# Patient Record
Sex: FEMALE | Race: WHITE
[De-identification: names, ages, dates, MRNs, and addresses within clinical notes are randomized per-mention and may not be internally consistent; named-entity substitution may affect disease eponyms.]

---

## 2019-01-19 ENCOUNTER — HOSPITAL ENCOUNTER (INPATIENT)
Dept: HOSPITAL 95 - ER | Age: 84
LOS: 3 days | Discharge: HOME | DRG: 392 | End: 2019-01-22
Attending: FAMILY MEDICINE | Admitting: FAMILY MEDICINE
Payer: MEDICARE

## 2019-01-19 VITALS — WEIGHT: 282.85 LBS | BODY MASS INDEX: 48.29 KG/M2 | HEIGHT: 64.02 IN

## 2019-01-19 DIAGNOSIS — G70.9: ICD-10-CM

## 2019-01-19 DIAGNOSIS — Z88.1: ICD-10-CM

## 2019-01-19 DIAGNOSIS — E78.1: ICD-10-CM

## 2019-01-19 DIAGNOSIS — I12.9: ICD-10-CM

## 2019-01-19 DIAGNOSIS — Z88.2: ICD-10-CM

## 2019-01-19 DIAGNOSIS — Z79.84: ICD-10-CM

## 2019-01-19 DIAGNOSIS — E11.65: ICD-10-CM

## 2019-01-19 DIAGNOSIS — Z98.84: ICD-10-CM

## 2019-01-19 DIAGNOSIS — R60.0: ICD-10-CM

## 2019-01-19 DIAGNOSIS — N28.9: ICD-10-CM

## 2019-01-19 DIAGNOSIS — R50.9: ICD-10-CM

## 2019-01-19 DIAGNOSIS — Z98.0: ICD-10-CM

## 2019-01-19 DIAGNOSIS — Z87.891: ICD-10-CM

## 2019-01-19 DIAGNOSIS — E11.22: ICD-10-CM

## 2019-01-19 DIAGNOSIS — E86.0: ICD-10-CM

## 2019-01-19 DIAGNOSIS — E03.9: ICD-10-CM

## 2019-01-19 DIAGNOSIS — E83.42: ICD-10-CM

## 2019-01-19 DIAGNOSIS — Z88.5: ICD-10-CM

## 2019-01-19 DIAGNOSIS — E66.9: ICD-10-CM

## 2019-01-19 DIAGNOSIS — K52.9: Primary | ICD-10-CM

## 2019-01-19 DIAGNOSIS — E11.42: ICD-10-CM

## 2019-01-19 DIAGNOSIS — Z90.49: ICD-10-CM

## 2019-01-19 DIAGNOSIS — N18.3: ICD-10-CM

## 2019-01-19 DIAGNOSIS — E78.5: ICD-10-CM

## 2019-01-19 LAB
ALBUMIN SERPL BCP-MCNC: 3.2 G/DL (ref 3.4–5)
ALBUMIN/GLOB SERPL: 0.9 {RATIO} (ref 0.8–1.8)
ALT SERPL W P-5'-P-CCNC: 17 U/L (ref 12–78)
ANION GAP SERPL CALCULATED.4IONS-SCNC: 9 MMOL/L (ref 6–16)
AST SERPL W P-5'-P-CCNC: 14 U/L (ref 12–37)
BASOPHILS # BLD AUTO: 0.02 K/MM3 (ref 0–0.23)
BASOPHILS NFR BLD AUTO: 0 % (ref 0–2)
BILIRUB SERPL-MCNC: 1.1 MG/DL (ref 0.1–1)
BUN SERPL-MCNC: 33 MG/DL (ref 8–24)
CALCIUM SERPL-MCNC: 8.4 MG/DL (ref 8.5–10.1)
CHLORIDE SERPL-SCNC: 105 MMOL/L (ref 98–108)
CO2 SERPL-SCNC: 28 MMOL/L (ref 21–32)
CREAT SERPL-MCNC: 1.53 MG/DL (ref 0.4–1)
DEPRECATED RDW RBC AUTO: 44.9 FL (ref 35.1–46.3)
EOSINOPHIL # BLD AUTO: 0.11 K/MM3 (ref 0–0.68)
EOSINOPHIL NFR BLD AUTO: 1 % (ref 0–6)
ERYTHROCYTE [DISTWIDTH] IN BLOOD BY AUTOMATED COUNT: 13.2 % (ref 11.7–14.2)
GLOBULIN SER CALC-MCNC: 3.4 G/DL (ref 2.2–4)
GLUCOSE SERPL-MCNC: 116 MG/DL (ref 70–99)
HCT VFR BLD AUTO: 43.2 % (ref 33–51)
HGB BLD-MCNC: 14 G/DL (ref 11.5–16)
IMM GRANULOCYTES # BLD AUTO: 0.03 K/MM3 (ref 0–0.1)
IMM GRANULOCYTES NFR BLD AUTO: 0 % (ref 0–1)
LYMPHOCYTES # BLD AUTO: 2 K/MM3 (ref 0.84–5.2)
LYMPHOCYTES NFR BLD AUTO: 19 % (ref 21–46)
MCHC RBC AUTO-ENTMCNC: 32.4 G/DL (ref 31.5–36.5)
MCV RBC AUTO: 93 FL (ref 80–100)
MONOCYTES # BLD AUTO: 1.66 K/MM3 (ref 0.16–1.47)
MONOCYTES NFR BLD AUTO: 15 % (ref 4–13)
NEUTROPHILS # BLD AUTO: 7 K/MM3 (ref 1.96–9.15)
NEUTROPHILS NFR BLD AUTO: 65 % (ref 41–73)
NRBC # BLD AUTO: 0 K/MM3 (ref 0–0.02)
NRBC BLD AUTO-RTO: 0 /100 WBC (ref 0–0.2)
PLATELET # BLD AUTO: 250 K/MM3 (ref 150–400)
POTASSIUM SERPL-SCNC: 3.7 MMOL/L (ref 3.5–5.5)
PROT SERPL-MCNC: 6.6 G/DL (ref 6.4–8.2)
SODIUM SERPL-SCNC: 142 MMOL/L (ref 136–145)

## 2019-01-20 LAB
ALBUMIN SERPL BCP-MCNC: 2.5 G/DL (ref 3.4–5)
ALBUMIN/GLOB SERPL: 0.9 {RATIO} (ref 0.8–1.8)
ALT SERPL W P-5'-P-CCNC: 11 U/L (ref 12–78)
ANION GAP SERPL CALCULATED.4IONS-SCNC: 8 MMOL/L (ref 6–16)
AST SERPL W P-5'-P-CCNC: 10 U/L (ref 12–37)
BASOPHILS # BLD AUTO: 0.03 K/MM3 (ref 0–0.23)
BASOPHILS NFR BLD AUTO: 0 % (ref 0–2)
BILIRUB SERPL-MCNC: 1.1 MG/DL (ref 0.1–1)
BUN SERPL-MCNC: 25 MG/DL (ref 8–24)
CALCIUM SERPL-MCNC: 6.9 MG/DL (ref 8.5–10.1)
CHLORIDE SERPL-SCNC: 112 MMOL/L (ref 98–108)
CO2 SERPL-SCNC: 25 MMOL/L (ref 21–32)
CREAT SERPL-MCNC: 1.23 MG/DL (ref 0.4–1)
CRP SERPL-MCNC: 1.92 MG/DL (ref 0–0.3)
DEPRECATED RDW RBC AUTO: 45 FL (ref 35.1–46.3)
EOSINOPHIL # BLD AUTO: 0.23 K/MM3 (ref 0–0.68)
EOSINOPHIL NFR BLD AUTO: 2 % (ref 0–6)
ERYTHROCYTE [DISTWIDTH] IN BLOOD BY AUTOMATED COUNT: 13.3 % (ref 11.7–14.2)
GLOBULIN SER CALC-MCNC: 2.7 G/DL (ref 2.2–4)
GLUCOSE SERPL-MCNC: 102 MG/DL (ref 70–99)
HCT VFR BLD AUTO: 35.5 % (ref 33–51)
HGB BLD-MCNC: 11.5 G/DL (ref 11.5–16)
IMM GRANULOCYTES # BLD AUTO: 0.03 K/MM3 (ref 0–0.1)
IMM GRANULOCYTES NFR BLD AUTO: 0 % (ref 0–1)
KETONES UR STRIP-MCNC: (no result) MG/DL
LEUKOCYTE ESTERASE UR QL STRIP: (no result)
LYMPHOCYTES # BLD AUTO: 2.31 K/MM3 (ref 0.84–5.2)
LYMPHOCYTES NFR BLD AUTO: 24 % (ref 21–46)
MAGNESIUM SERPL-MCNC: 1.1 MG/DL (ref 1.6–2.4)
MCHC RBC AUTO-ENTMCNC: 32.4 G/DL (ref 31.5–36.5)
MCV RBC AUTO: 92 FL (ref 80–100)
MONOCYTES # BLD AUTO: 1.78 K/MM3 (ref 0.16–1.47)
MONOCYTES NFR BLD AUTO: 18 % (ref 4–13)
NEUTROPHILS # BLD AUTO: 5.27 K/MM3 (ref 1.96–9.15)
NEUTROPHILS NFR BLD AUTO: 55 % (ref 41–73)
NRBC # BLD AUTO: 0 K/MM3 (ref 0–0.02)
NRBC BLD AUTO-RTO: 0 /100 WBC (ref 0–0.2)
PLATELET # BLD AUTO: 200 K/MM3 (ref 150–400)
POTASSIUM SERPL-SCNC: 3.2 MMOL/L (ref 3.5–5.5)
PROT SERPL-MCNC: 5.2 G/DL (ref 6.4–8.2)
PROT UR STRIP-MCNC: (no result) MG/DL
RBC #/AREA URNS HPF: (no result) /HPF (ref 0–2)
SODIUM SERPL-SCNC: 145 MMOL/L (ref 136–145)
SP GR SPEC: 1.02 (ref 1–1.02)
TSH SERPL DL<=0.005 MIU/L-ACNC: 2.05 UIU/ML (ref 0.36–4.8)
UROBILINOGEN UR STRIP-MCNC: (no result) MG/DL
WBC #/AREA URNS HPF: (no result) /HPF (ref 0–5)

## 2019-01-20 NOTE — NUR
SHIFT SUMMARY
PT ALERT AND ORIENTED. OFFERS NO C/O'S. PT ATE SOME JELLO AND SHORTLY AFTER TO
BATHROOM MULTIPLE TIMES WITH LOOSE STOOLS. PT UPSET BECAUSE SHE IS NOT ABLE TO
ALWAYS MAKE IT TO THE BATHROOM. PT BECOMING WEAKER THE MORE SHE GETS UP AND
NEEDS HELP GETTING LOWER EXTREMITIES BACK INTO BED. PT HAS BEEN SLEEPING NOW
AND NOT UP TO BATHROOM WITH LOOSE STOOLS SINCE ABOUT MIDNIGHT. IVF'S INFUSING
PER PUMP WITHOUT DIFFICULTY, ARMBOARD PLACED ON RIGHT ARM PER PT REQUEST TO
PREVENT HER FROM BENDING AT ELBOW. WILL CONTINUE TO MONITOR.

## 2019-01-20 NOTE — NUR
PATIENT A/O X4, UP WITH SBA TO RESTROOM. TOLERATING ADA DIET THIS SHIFT, NO
DIARRHEA SINCE THIS AM. HAS NOT REQUIRED ANYTHING FOR PAIN. VSS. 18G IV TO L
UPPER ARM WITH NS @ 125/HR. MAGNESIUM AND POTASSIUM REPLACED TODAY. ACHS BLOOD
SUGARS, NO COVERAGE NEEDED THIS SHIFT. CALM AND COOPERATIVE WITH CARE, CALLS
APPROPRIATELY FOR ASSISTANCE. NO ACUTE CHANGES THIS SHIFT.

## 2019-01-21 LAB
ALBUMIN SERPL BCP-MCNC: 2.6 G/DL (ref 3.4–5)
ANION GAP SERPL CALCULATED.4IONS-SCNC: 7 MMOL/L (ref 6–16)
BUN SERPL-MCNC: 15 MG/DL (ref 8–24)
CALCIUM SERPL-MCNC: 7.3 MG/DL (ref 8.5–10.1)
CHLORIDE SERPL-SCNC: 114 MMOL/L (ref 98–108)
CO2 SERPL-SCNC: 22 MMOL/L (ref 21–32)
CREAT SERPL-MCNC: 1.1 MG/DL (ref 0.4–1)
DEPRECATED RDW RBC AUTO: 45.8 FL (ref 35.1–46.3)
ERYTHROCYTE [DISTWIDTH] IN BLOOD BY AUTOMATED COUNT: 13.2 % (ref 11.7–14.2)
GLUCOSE SERPL-MCNC: 135 MG/DL (ref 70–99)
HCT VFR BLD AUTO: 37.1 % (ref 33–51)
HGB BLD-MCNC: 11.8 G/DL (ref 11.5–16)
MAGNESIUM SERPL-MCNC: 1.8 MG/DL (ref 1.6–2.4)
MCHC RBC AUTO-ENTMCNC: 31.8 G/DL (ref 31.5–36.5)
MCV RBC AUTO: 94 FL (ref 80–100)
NRBC # BLD AUTO: 0 K/MM3 (ref 0–0.02)
NRBC BLD AUTO-RTO: 0 /100 WBC (ref 0–0.2)
PHOSPHATE SERPL-MCNC: 2.1 MG/DL (ref 2.5–4.9)
PLATELET # BLD AUTO: 194 K/MM3 (ref 150–400)
POTASSIUM SERPL-SCNC: 3.7 MMOL/L (ref 3.5–5.5)
SODIUM SERPL-SCNC: 143 MMOL/L (ref 136–145)

## 2019-01-21 NOTE — NUR
SHIFT SUMMARY
PT HAD BOUT OF LOOSE STOOLS X 3-4 EARLY ON IN EVENING. PT BECOMES TEARY
BECAUSE SHE FEELS SO WEAK AND NEEDS HELP TO GET LOWER EXTREMITIES BACK INTO
BED. PT HAS SLEPT WELL SINCE ABOUT 10 PM, NO FURTHER STOOLS. WILL CONTINUE TO
MONITOR.

## 2019-01-21 NOTE — NUR
ALERT AND ORIENTED. STANDBY ASSIST TO BATHROOM. IV PATENT W/POTASSIUM INFUSING
W/NACL AT SLOW RATE. DENIES PAIN. UNLABORED RESPIRATIONS. PLEASANT,
COOPERATIVE. POSSIBLE D'C TOMORROW. WILL CONTINUE TO MONITOR.

## 2019-01-21 NOTE — NUR
Patient gave consent for student nurse, Marga Marmolejo to assist RN with her care
on 1/21/19.  Care will be given 1/22/19.

## 2019-01-22 LAB
ANION GAP SERPL CALCULATED.4IONS-SCNC: 8 MMOL/L (ref 6–16)
BASOPHILS # BLD AUTO: 0.02 K/MM3 (ref 0–0.23)
BASOPHILS NFR BLD AUTO: 0 % (ref 0–2)
BUN SERPL-MCNC: 11 MG/DL (ref 8–24)
CALCIUM SERPL-MCNC: 7.5 MG/DL (ref 8.5–10.1)
CHLORIDE SERPL-SCNC: 114 MMOL/L (ref 98–108)
CO2 SERPL-SCNC: 21 MMOL/L (ref 21–32)
CREAT SERPL-MCNC: 1.09 MG/DL (ref 0.4–1)
DEPRECATED RDW RBC AUTO: 46.5 FL (ref 35.1–46.3)
EOSINOPHIL # BLD AUTO: 0.27 K/MM3 (ref 0–0.68)
EOSINOPHIL NFR BLD AUTO: 3 % (ref 0–6)
ERYTHROCYTE [DISTWIDTH] IN BLOOD BY AUTOMATED COUNT: 13.4 % (ref 11.7–14.2)
GLUCOSE SERPL-MCNC: 146 MG/DL (ref 70–99)
HCT VFR BLD AUTO: 36.8 % (ref 33–51)
HGB BLD-MCNC: 11.7 G/DL (ref 11.5–16)
IMM GRANULOCYTES # BLD AUTO: 0.09 K/MM3 (ref 0–0.1)
IMM GRANULOCYTES NFR BLD AUTO: 1 % (ref 0–1)
LYMPHOCYTES # BLD AUTO: 2.04 K/MM3 (ref 0.84–5.2)
LYMPHOCYTES NFR BLD AUTO: 21 % (ref 21–46)
MCHC RBC AUTO-ENTMCNC: 31.8 G/DL (ref 31.5–36.5)
MCV RBC AUTO: 94 FL (ref 80–100)
MONOCYTES # BLD AUTO: 1.5 K/MM3 (ref 0.16–1.47)
MONOCYTES NFR BLD AUTO: 16 % (ref 4–13)
NEUTROPHILS # BLD AUTO: 5.71 K/MM3 (ref 1.96–9.15)
NEUTROPHILS NFR BLD AUTO: 59 % (ref 41–73)
NRBC # BLD AUTO: 0 K/MM3 (ref 0–0.02)
NRBC BLD AUTO-RTO: 0 /100 WBC (ref 0–0.2)
PLATELET # BLD AUTO: 187 K/MM3 (ref 150–400)
POTASSIUM SERPL-SCNC: 3.7 MMOL/L (ref 3.5–5.5)
SODIUM SERPL-SCNC: 143 MMOL/L (ref 136–145)

## 2019-01-22 NOTE — NUR
REVIEW D'C INSTRUCTIONS EXTENSIVELY.AWARE TO  MEDS AT [facility]. REVIEW
ALL NEW MEDS WITH PATIENT BEING FAMILAR WITH HOW TO GIVE INSULIN INJECTIONS.
ALREADY HAS APPT W/PCP. AWARE IF HAVING PROBLEMS TO CALL PCP OR GO TO E.R. IN
W/C W/ESCORT AND MULTIPLE FAMILY MEMBERS.

## 2019-01-22 NOTE — NUR
SHIFT SUMMARY
PT AWAKE, SITTING IN CHAIR AT BS, DURING SHIFT REPORT. VISITORS IN RM. PT
REPORTED THAT SHE HAS HAD DIARRHEA FOR SEVERAL MONTHS, AND UNABLE TO GET IT
STOPPED OR CHECKED OUT. PT REPORTED THAT SHE IS THE CAREGIVER FOR HER 
AND UNABLE TO LEAVE HIM, UNTIL DAUGHTER CAME UP FOR A VISIT. PT REPORTED
IMPROVEMENT SINCE ADMISSION. CALLS FOR ASSIST TO BTHRM WITH IV PUMP. PT
MORBIDLY, MAKING MOBILITY DIFFICULT AND DOES NOT LIKE TOO MUCH HELP D/T
ARTHRITIS PAIN. C/O SWELLING IN HER LEGS, BUT SITS IN A CHAIR WITH LE'S
DEPENDANT MUCH OF THE TIME. ENCOURGED PT TO ELEVATE WHEN POSSIBLE. PT REPORTED
DIFFICULTY WITH LYING IN BED D/T BACK AND SHOULDER PAIN. HAS SEEMED TO REST
WELL THOUGH TONIGHT, WHILE IN BED. HAS DENIED NEEDS, EXCEPT FOR ASSIST TO
BTHRM. CALL LT IN REACH. ABLE TO MAKE NEEDS KNOWN.

## 2019-04-18 ENCOUNTER — HOSPITAL ENCOUNTER (EMERGENCY)
Dept: HOSPITAL 95 - ER | Age: 84
Discharge: HOME | End: 2019-04-18
Payer: MEDICARE

## 2019-04-18 VITALS — WEIGHT: 247.01 LBS | HEIGHT: 64 IN | BODY MASS INDEX: 42.17 KG/M2

## 2019-04-18 DIAGNOSIS — N39.0: ICD-10-CM

## 2019-04-18 DIAGNOSIS — M54.5: Primary | ICD-10-CM

## 2019-04-18 LAB
LEUKOCYTE ESTERASE UR QL STRIP: (no result)
SP GR SPEC: 1 (ref 1–1.02)
UROBILINOGEN UR STRIP-MCNC: (no result) MG/DL
WBC #/AREA URNS HPF: (no result) /HPF (ref 0–5)

## 2023-01-22 NOTE — NUR
Summary: Patient admitted overnight from ER for CHF exas. AOx4. VSS. Med rec
completed with her daughter. Omalley present on admission so we removed that and
changed it out, sent UA per protocol. Patient has red rash and excoriation on
folds in elbows, knees, groin and neck. Received order for nystatin powder and
applied it. Patient is a max assist for mobility and was able to stand and
pivot off of the ER stretcher when she arrived. Patient also has an abscess on
the back of her neck that was lanced a few days ago. On isolation for MRSA. On
3L NC.

## 2023-01-22 NOTE — NUR
ALERT AND ORIENTED, EMOTIONAL CRYING, REPORTS "EVERYTHING HURTS", DAUGHTER
HELPFUL IN CARE AT BEDSIDE, OINTMENT AND POWDER TO YEAST FOLDS, BOIL TO LEFT
POSTERIOR NECK DRSG CHANGED, WOUND CARE CONSULT ENTERED. 3L NC HYDRATION SATS
AT 95%, EASILY SOB, DIMINSHED FINE CRACKLE IN BASES OF LUNGS, ENCOURAGE DEEP
BREATHING AND COUGH. CALL LIGHT WITH IN REACH, MAKES NEEDS KNOWN, WILL RELAY
TO PM RN

## 2023-01-23 NOTE — NUR
PATIENT IS ALERT AND ORIENTED AND COOPERATIVE WITH CARE. [facility]. PATIENT'S
DAUGHTER HAS BEEN AT THE BEDSIDE THROUGHOUT THE SHIFT. PATIENT C/O GENERALIZED
PAIN AND PAIN/TINGLING IN HER BLE. DR. SANTANA ORDERED CYMBALTA FOR HER PAIN
AND ANXIETY. PATIENT STATED SHE WAS NAUSEATED LAST NIGHT AND IT RESOLVED THIS
MORNING UNTIL THIS AFTERNOON AFTER HER FIRST DOSE OF CYMBALTA THEN SHE C/O
NAUSEA ONCE AGAIN. THE PATIENT HAS BEEN ABLE TO SLEEP WELL THIS AFTERNOON ONCE
MEDICATED FOR NAUSEA. PATIENT IS C/O CONSTIPATION. DR. SANTANA HAS BEEN
NOTIFIED BY TELEPHONE OF THIS. WILL CONTINUE TO MONITOR

## 2023-01-23 NOTE — NUR
Summary: Patient uncomfortable in bed overnight. Found patient a different bed
and moved her over to it with staff members for comfort. Patient frequently
repositioned on 6 pillows throughout night. Stated she just wasn't comfortable
in the bed that her legs were tight from swelling and cannot tolerate laying
on her right side nor can she tolerate laying back in the bed. Patient 2x max
assist to turn in bed. Omalley in place. IV ABX given. On 3L NC. Patient became
nauseous overnight gave prn Zofran. Patient dry heaved but no emesis. Aox4.
Cream and power applied in folds for rash. Call light in reach.

## 2023-01-24 NOTE — NUR
PT A&OX4, PLEASANT, COOPERATIVE. CALL LIGHT IN REACH, CALLS APPROPRIATELY.
NAUSEA WITH INTERMITTENT DRY HEAVES DURING SHIFT, ZOFRAN IS EFFECTIVE. MOD
ASSIST TO REPOSITION IN BED. REDNESS IN SKIN FOLDS STILL PRESENT. NEW IV
PLACED, 22G L FA. NO EVENTS OVER NIGHT. STABLE ON 3L O2 NC.

## 2023-01-24 NOTE — NUR
PT IS A/OX4, COOPERATIVE. PT REPORTS NOT FEELING MUCH IMPROVED TODAY. PT
REPORTED CONSTIPATION AND AFTER SUPPOSITIRY AND ENEMAS HAD SOME HARD FORMED
STOOL OUTPUT.THE PT IS ON 1.5L/MIN O2 AT THIS TIME. PT WAS MEDICATED FOR PAIN
THIS AFTERNOON X1 WITH ULTRAM. PTS DAUGHTER IS AT THE BEDSIDE. PLAN FOR
DISCHARGE TO HOME WITH HOSPICE IN AM. CALL LIGHT IN REACH. WILL CONTINUE TO
MONITOR AND ASSESS FOR CHANGES

## 2023-01-24 NOTE — NUR
PT IMPACTED WITH STOOL, GAVE FLEETS ENEMA WITH NO RESULTS TRIED SMALL AMOUNT
OF DIGITAL DISIMPACTMENT THEN GAVE SOAP DINA ENEMA THE PT HAD A LARGE AMOUNT OF
HARD FORMED STOOL OUT

## 2023-01-25 NOTE — NUR
PT DISCHARGED FROM THE UNIT REPORT GIVEN TO HOSPICE NURSE. PT LEFT VIA GURNEY
WITH TRANSPORT. IV REMOVED. MENENDEZ LEFT IN PLACE. PT WILL D/C WITH O2

## 2023-01-25 NOTE — NUR
PT A0X4, SLEPT MUCH OF SHIFT, AT TIMES REFUSED CARES AND MEDS. NO COMPLAINTS
OF N&V LAST NIGHT AS IN RECENT PREVIOUS SHIFTS. TOLERATING SCHEDULED
ANTIBIOTICS. NO BM DURING MY SHIFT. NO EVENTS OVER NIGHT.